# Patient Record
Sex: FEMALE | Race: BLACK OR AFRICAN AMERICAN
[De-identification: names, ages, dates, MRNs, and addresses within clinical notes are randomized per-mention and may not be internally consistent; named-entity substitution may affect disease eponyms.]

---

## 2017-07-20 ENCOUNTER — HOSPITAL ENCOUNTER (OUTPATIENT)
Dept: HOSPITAL 62 - RAD | Age: 42
End: 2017-07-20
Attending: INTERNAL MEDICINE
Payer: SELF-PAY

## 2017-07-20 DIAGNOSIS — Z85.3: ICD-10-CM

## 2017-07-20 DIAGNOSIS — Z15.02: ICD-10-CM

## 2017-07-20 DIAGNOSIS — Z15.01: ICD-10-CM

## 2017-07-20 DIAGNOSIS — R59.0: Primary | ICD-10-CM

## 2017-07-20 PROCEDURE — 76882 US LMTD JT/FCL EVL NVASC XTR: CPT

## 2017-07-20 NOTE — RADIOLOGY REPORT (SQ)
EXAM DESCRIPTION:  U/S EXTREMITY NONVASCULAR LTD



COMPLETED DATE/TIME:  7/20/2017 3:08 pm



REASON FOR STUDY:  ENLARGED LYMPH NODES IN ARMPIT R59.0  LOCALIZED ENLARGED LYMPH NODES



COMPARISON:  None.



TECHNIQUE:  Dynamic and static grayscale images acquired of the localized site of clinical concern an
d recorded on PACS. Additional selected color Doppler and spectral images recorded.

SITE OF CONCERN: Right axilla



LIMITATIONS:  None.



FINDINGS:  SKIN AND SUBCUTANEOUS TISSUES: A small lymph node is identified measuring 1.0 x 0.8 x 0.5 
cm in diameters.  There is an associated asymmetric hypoechoic area measuring 0.6 x 0.5 x 0.4 cm in d
iameter which I cannot exclude as an associated mass.

DEEP SOFT TISSUES/MUSCLES: No masses.  No fluid collections. No edema.

VASCULAR: No increased or decreased vascularity.  No occlusions.

OTHER: No other significant finding.



IMPRESSION:  Small lymph node with an associated asymmetric hypoechoic area as noted above which I ca
nnot exclude as a mass involving the lymph node.  No other discrete solid or cystic masses are identi
fied.  Followup is recommended.  This node may be amenable to ultrasound-guided biopsy.



TECHNICAL DOCUMENTATION:  JOB ID:  [id number]

 2011 [employer]- All Rights Reserved

## 2017-09-06 ENCOUNTER — HOSPITAL ENCOUNTER (OUTPATIENT)
Dept: HOSPITAL 62 - WI | Age: 42
End: 2017-09-06
Attending: INTERNAL MEDICINE
Payer: SELF-PAY

## 2017-09-06 DIAGNOSIS — Z15.02: ICD-10-CM

## 2017-09-06 DIAGNOSIS — R59.0: ICD-10-CM

## 2017-09-06 DIAGNOSIS — Z15.01: ICD-10-CM

## 2017-09-06 DIAGNOSIS — C50.911: Primary | ICD-10-CM

## 2017-09-06 PROCEDURE — 19083 BX BREAST 1ST LESION US IMAG: CPT

## 2017-09-06 PROCEDURE — 88342 IMHCHEM/IMCYTCHM 1ST ANTB: CPT

## 2017-09-06 PROCEDURE — 0HBT3ZX EXCISION OF RIGHT BREAST, PERCUTANEOUS APPROACH, DIAGNOSTIC: ICD-10-PCS | Performed by: RADIOLOGY

## 2017-09-06 PROCEDURE — 88305 TISSUE EXAM BY PATHOLOGIST: CPT

## 2017-09-08 NOTE — WOMENS IMAGING REPORT
EXAM DESCRIPTION:  U/S BREAST BX



COMPLETED DATE/TIME:  9/6/2017 2:37 pm



REASON FOR STUDY:  BREAST CANCER (C50.911) C50.911  MALIGNANT NEOPLASM OF UNSP SITE OF RIGHT FEMALE B
DOMINGO R59.0  LOCALIZED ENLARGED LYMPH NODES Z15.01  GENETIC SUSCEPTIBILITY TO MALIGNANT NEOPLASM OF YURI
AS



COMPARISON:  Ultrasound right axilla 7/28/2017



TECHNIQUE:  The procedure was discussed with the patient and the patient agreed to proceed.

The patient was scanned and the lymph node of concern in the right axilla.  This correlates with the 
area of concern on prior imaging studies. This area was targeted for ultrasound-guided core biopsy.

After sterile skin prep and 3.5 mL local lidocaine 1% for skin and deep tissue anesthesia, a 14 gauge
 coaxial core biopsy needle was used to obtain several cores of tissue from the lesion.  Under ultras
ound guidance, a ribbon clip was placed in the areas sampled.  There were no immediate post-procedure
 complications.

MAMMOGRAM:  Not performed

Pathology yields a diagnosis of benign lymphoid tissue, no atypia or malignancy identified.

Pathology is concordant.



LIMITATIONS:  None.



FINDINGS:  Ultrasound guided right axillary lymph node biopsy as described above.

POST PROCEDURE MAMMOGRAMS FOR MARKER PLACEMENT: No



IMPRESSION:  ULTRASOUND-GUIDED CORE BIOPSY OF THE RIGHT AXILLARY LYMPH NODE YIELDS A BENIGN DIAGNOSIS




COMMENT:  COMMUNICATION: Attempts at calling the patient directly with this information or unsuccessf
ul, patient's cell phone was not active

Patient medication list reviewed: Yes- Quality ID# [id number]:Eligible professional attests to documenting i
n the medical record they obtained, updated, or reviewed the patient's current medications.



TECHNICAL DOCUMENTATION:  JOB ID:  [id number]

 2011 [employer]- All Rights Reserved

## 2018-07-03 ENCOUNTER — HOSPITAL ENCOUNTER (EMERGENCY)
Dept: HOSPITAL 62 - ER | Age: 43
Discharge: HOME | End: 2018-07-03
Payer: SELF-PAY

## 2018-07-03 VITALS — DIASTOLIC BLOOD PRESSURE: 66 MMHG | SYSTOLIC BLOOD PRESSURE: 101 MMHG

## 2018-07-03 DIAGNOSIS — F17.210: ICD-10-CM

## 2018-07-03 DIAGNOSIS — M25.511: Primary | ICD-10-CM

## 2018-07-03 DIAGNOSIS — I10: ICD-10-CM

## 2018-07-03 DIAGNOSIS — Z90.13: ICD-10-CM

## 2018-07-03 DIAGNOSIS — Z90.710: ICD-10-CM

## 2018-07-03 PROCEDURE — 99406 BEHAV CHNG SMOKING 3-10 MIN: CPT

## 2018-07-03 PROCEDURE — 99283 EMERGENCY DEPT VISIT LOW MDM: CPT

## 2018-07-03 NOTE — ER DOCUMENT REPORT
ED Extremity Problem, Upper





- General


Chief Complaint: Shoulder Pain


Stated Complaint: SHOULDER PAIN


Time Seen by Provider: 07/03/18 12:05


Mode of Arrival: Ambulatory


Information source: Patient


Notes: 





43-year-old female presented ED for complaint of right shoulder pain with no 

definite injury.  She states that every once in a while when she tries to lift 

some reduce some she will get swelling to the shoulder.  She has full range of 

motion but some pain to the back of the shoulder and upper shoulder when she is 

doing the range of motion.  Patient is alert and oriented respirations regular 

and unlabored speaking in full sentences and walks with a even steady gait.


TRAVEL OUTSIDE OF THE [place] IN LAST 30 DAYS: No





- HPI


Patient complains to provider of: Right, Shoulder


Onset: Other - About a month ago not getting better


Recent injury: No


Quality of pain: Achy, Sharp


Severity of pain: Moderate


Pain Level: 3


Associated symptoms: Numbness - Sometime when the pain is real bad her fingers 

get numb


Exacerbated by: Movement, Exertion


Relieved by: Rest, Positioning


Similar symptoms previously: Yes


Recently seen / treated by doctor: No





- Related Data


Allergies/Adverse Reactions: 


 





No Known Allergies Allergy (Unverified 07/03/18 11:40)


 











Past Medical History





- General


Information source: Patient





- Social History


Smoking Status: Current Every Day Smoker


Cigarette use (# per day): Yes - 4 cigarettes a day


Chew tobacco use (# tins/day): No


Smoking Education Provided: Yes - 4 minutes


Frequency of alcohol use: Social


Drug Abuse: None


Occupation: [employer]


Lives with: Friend


Family History: Reviewed & Not Pertinent


Patient has suicidal ideation: No


Patient has homicidal ideation: No





- Medical History


Medical History: Other - Iron deficiency anemia





- Past Medical History


Cardiac Medical History: Reports: None


Pulmonary Medical History: Reports: None


EENT Medical History: Reports: None


Neurological Medical History: Reports: None


Endocrine Medical History: Reports: None


Renal/ Medical History: Reports: None


Malignancy Medical History: Reports: Hx Breast Cancer - Right


GI Medical History: Reports: None


Musculoskeltal Medical History: Reports None


Skin Medical History: Reports None


Psychiatric Medical History: Reports: Hx Anxiety, Hx Depression


Traumatic Medical History: Reports: None


Infectious Medical History: Reports: None


Past Surgical History: Reports: Hx Breast Surgery - bilateral mastectomy, Hx 

Hysterectomy





- Immunizations


Immunizations up to date: Yes





Review of Systems





- Review of Systems


Constitutional: No symptoms reported


EENT: No symptoms reported


Cardiovascular: No symptoms reported


Respiratory: No symptoms reported


Gastrointestinal: No symptoms reported


Genitourinary: No symptoms reported


Female Genitourinary: No symptoms reported


Musculoskeletal: Joint pain - right shoulder, Muscle pain, Muscle stiffness


Skin: No symptoms reported


Hematologic/Lymphatic: No symptoms reported


Neurological/Psychological: No symptoms reported


-: Yes All other systems reviewed and negative





Physical Exam





- Vital signs


Vitals: 





 











Temp Pulse Resp BP Pulse Ox


 


 98.2 F   76   16   136/86 H  100 


 


 07/03/18 11:53  07/03/18 11:53  07/03/18 11:53  07/03/18 11:53  07/03/18 11:53














- Extremities


General upper extremity: Normal inspection, Normal color, Normal temperature


General lower extremity: Normal inspection, Nontender, Normal color, Normal ROM

, Normal temperature, Normal weight bearing.  No: Nazario's sign


Shoulder: Tender.  No: Limited ROM - Pain with range of motion but has full 

range of motion





Course





- Re-evaluation


Re-evalutation: 





07/03/18 13:21


X-ray discussed with patient and written report of x-ray given to patient for 

follow-up with orthopedics.  There is no acute radiological injuries.  Patient 

was instructed to follow-up with orthopedics to ensure there is no muscle or 

tendon damage.  Patient was given instructions on ice warm packs shoulder 

exercises and use of ibuprofen and Tylenol for her pain.  Patient was able to 

verbalize understanding of instructions and verbalized agreement with treatment 

plan.





- Vital Signs


Vital signs: 





 











Temp Pulse Resp BP Pulse Ox


 


 98.2 F   76   16   136/86 H  100 


 


 07/03/18 11:53  07/03/18 11:53  07/03/18 11:53  07/03/18 11:53  07/03/18 11:53














- Diagnostic Test


Radiology reviewed: Image reviewed, Reports reviewed





Discharge





- Discharge


Clinical Impression: 


Right shoulder pain


Qualifiers:


 Chronicity: acute Qualified Code(s): M25.511 - Pain in right shoulder





HTN (hypertension)


Qualifiers:


 Hypertension type: unspecified Qualified Code(s): I10 - Essential (primary) 

hypertension





Condition: Stable


Disposition: HOME, SELF-CARE


Additional Instructions: 


Shoulder Injury





     You have injured your shoulder.  This usually results from stretching or 

tearing of the tendons during trauma.  Time and protection are required in 

order to heal properly.  Many injuries are quite disabling, and should be taken 

seriously.


     Initial treatment includes cold packs and a sling to rest the shoulder.  

The physician has assessed the seriousness of your injury, and has outlined a 

treatment plan.  Understand that this treatment may change, depending on how 

you progress.


     If a re-examination was recommended, it is important that you follow up as 

instructed.  Some shoulder injuries (such as partial tear of the rotator cuff) 

are only suspected after you've failed to improve.


Call us if there's severe pain, numbness, or loss of function.








Exercise Program for the Shoulder





     Since the shoulder moves in so many directions, the joint attachment is 

weak.  Muscles provide most of the stability to the shoulder.  You must 

exercise your shoulder to prevent painful instability or stiffening.


     PASSIVE - These may be begun within a few days of the injury. While 

standing, lean forward, allowing the arm to hang down towards the floor.  Move 

the arm in small circles while slowly twisting your chest towards and away from 

the hanging arm.  Do this for one minute.


     ACTIVE - These may be performed when the doctor gives permission. Begin 

with the arms at the sides.  Raise the arms forward (shoulder's width apart) 

until they reach shoulder level.  Then slowly swing both arms back until they 

are aiming straight out away from each other. Then bring them forward again, 

and finally, lower them to your sides. Repeat 20 to 30 times.  As you improve, 

put weights in your hands for the exercise.  Start with one pound, and work up 

to 10 pounds.  Never use more than is comfortable.  Athletes may work up to 30 

pounds.





Acetaminophen





     Acetaminophen may be taken for pain relief or fever control. It's much 

safer than aspirin, offering a wider range of "safe" dosages.  It is safe 

during pregnancy.  Some brand names are Tylenol, Panadol, Datril, Anacin 3, 

Tempra, and Liquiprin. Acetaminophen can be repeated every four hours.  The 

following are maximum recommended dosages:





WEIGHT         Dose             Drops                  Elixir        Chewable(

80mg)


(LBS.)                            drprs=droppers    tsp=teaspoon


6                 40 mg            .4 ml (1/2)


6-11            80 mg            .8 ml (full)            1/2   tsp           1 

      tab


12-16         120 mg           1 1/2 drprs            3/4   tsp           1 1/2

  tabs


17-23         160 mg             2  drprs              1      tsp           2  

     tabs


24-30         240 mg             3  drprs              1 1/2 tsp           3   

    tabs


30-35         320 mg                                     2       tsp           

4       tabs


36-41         360 mg                                     2 1/4 tsp           4 1

/2  tabs


42-47         400 mg                                     2 1/2 tsp           5 

      tabs


48-53         480 mg                                     3       tsp          6

       tabs


54-59         520 mg                                     3  1/4 tsp          6 1

/2 tabs


60-64         560 mg                                     3  1/2 tsp          7 

     tabs 


65-70         600 mg                                     3  3/4 tsp          7 1

/2 tabs


71-76         640 mg                                     4       tsp           

8      tabs


77-82         720 mg                                     4 1/2  tsp           9

      tabs


83-88         800 mg                                     5       tsp           

10     tabs





>89 pounds or adults          650 mg to 900 mg 





Acetaminophen can be repeated every four hours. Maximum daily dose not to 

exceed 4000 mg.





   These maximum recommended dosages are slightly higher than the dosages 

written on the product container, but these dosages are very safe and well 

below the toxic dosage for acetaminophen.








USE OF TYLENOL (ACETAMINOPHEN):


     Acetaminophen may be taken for pain relief or fever control. It's much 

safer than aspirin, offering a wider range of "safe" dosages.  It is safe 

during pregnancy.  Some brand names are Tylenol, Panadol, Datril, Anacin 3, 

Tempra, and Liquiprin. Acetaminophen can be repeated every four hours.  The 

following are maximum recommended dosages:





WEIGHT         Dose             Drops                  Elixir        Chewable(

80mg)


(LBS.)                            drprs=droppers    tsp=teaspoon


6               40 mg            0.4 ml (1/2)


6-11            80 mg            0.8 ml (full)              tsp               

   1       tab


12-16         120 mg           1 1/2 drprs             3/4  tsp               1 

1/2  tabs


17-23         160 mg             2  drprs             1    tsp                 

  2       tabs


24-30         240 mg             3  drprs             1 1/2 tsp                

3       tabs


30-35         320 mg                                       2    tsp            

       4       tabs


36-41         360 mg                                       2 1/4   tsp         

     4 1/2 tabs


42-47         400 mg                                       2 1/2   tsp         

     5      tabs


48-53         480 mg                                       3    tsp            

       6      tabs


54-59         520 mg                                       3  1/4  tsp         

     6 1/2 tabs


60-64         560 mg                                       3  1/2  tsp         

     7      tabs 


65-70         600 mg                                       3  3/4  tsp         

     7 1/2 tabs


71-76         640 mg                                       4   tsp             

      8      tabs


77-82         720 mg                                       4 1/2   tsp         

    9      tabs


83-88         800 mg                                       5   tsp             

    10      tabs





>89 pounds or adults          650 mg to 900 mg





Acetaminophen can be repeated every four hours.  Maximum dose not to exceed 

4000 mg a day.





   These maximum recommended dosages are slightly higher than the dosages 

written on the product container, but these dosages are very safe and below the 

toxic dosage for acetaminophen.








ICE PACKS:


     Apply ice packs frequently against the painful area.  Many different 

schedules are recommended, such as "20 minutes on, 20 minutes off" or "one hour 

ice, two hours rest."  If you need to work, you may need to go longer between 

ice treatments.  You should plan to have the area ice packed AT LEAST one 

fourth of the time.


     The ice should be applied over the wrap, tape, or splint, or over a layer 

of cloth -- not directly against the skin.  Some ice bags have a built-in cloth 

and can be put directly on the skin.





WARM PACKS:


     After approximately two days, apply gentle heat (such as a heating pad or 

hot water bottle) for about 20 to 30 minutes about every two hours -- at least 

four times daily.  Warmth and elevation will help you make a more rapid recovery

, and will ease the pain considerably.


     Do not use HOT heat, and never apply heat for longer than 30 minutes.  The 

continuous heat can invisibly damage skin and muscles -- even when no burn is 

seen on the surface.  Damaged muscles can make you MORE sore.





MUSCLE RELAXERS: 


     Muscle relaxing medications are usually prescribed for acute muscle spasm 

or injury to the neck and back.  They are often combined with antiinflammatory 

pain medication for increased relief.


     You may stop the muscle relaxer when the pain and stiffness have improved.

  Start the medication again if spasms recur.  


     Muscle relaxers may cause drowsiness, especially with the first dose.  Do 

not operate machinery or drive while under the effects of the medication.  Most 

muscle relaxers last up to 24 hours.  Do not combine the medication with 

alcohol.





FOLLOW-UP CARE:


If you have been referred to a physician for follow-up care, call the physician

s office for an appointment as you were instructed or within the next two days.

  If you experience worsening or a significant change in your symptoms, notify 

the physician immediately or return to the Emergency Department at any time for 

re-evaluation.





Prescriptions: 


Cyclobenzaprine HCl [Flexeril 5 mg Tablet] 5 mg PO TID #15 tablet


Forms:  Elevated Blood Pressure, Smoking Cessation Education


Referrals: 


CLEMENT CEDILLO MD [Primary Care Provider] - Follow up as needed


LINO SHEARER MD [ACTIVE STAFF] - Follow up as needed

## 2018-07-03 NOTE — RADIOLOGY REPORT (SQ)
EXAM DESCRIPTION:  SHOULDER RIGHT 2 OR MORE VIEWS



COMPLETED DATE/TIME:  7/3/2018 12:33 pm



REASON FOR STUDY:  pain decreased rom



COMPARISON:  None.



NUMBER OF VIEWS:  Three views.



TECHNIQUE:  Internal rotation, external rotation, and Y view images acquired of the right shoulder.



LIMITATIONS:  None.



FINDINGS:  MINERALIZATION: Normal.

BONES: No acute fracture or dislocation.  No worrisome bone lesions.

JOINTS: No glenohumeral joint malalignment.  No AC joint widening.

VISUALIZED LUNGS AND RIBS: No pneumothorax.  No rib fracture.

SOFT TISSUES: No radiopaque foreign body.

OTHER: No other significant finding.



IMPRESSION:  No acute findings



TECHNICAL DOCUMENTATION:  JOB ID:  [id number]

 2011 [employer]- All Rights Reserved



Reading location - IP/workstation name: [email or web address]

## 2018-12-26 ENCOUNTER — HOSPITAL ENCOUNTER (EMERGENCY)
Dept: HOSPITAL 62 - ER | Age: 43
LOS: 1 days | Discharge: HOME | End: 2018-12-27
Payer: SELF-PAY

## 2018-12-26 DIAGNOSIS — S41.112A: Primary | ICD-10-CM

## 2018-12-26 DIAGNOSIS — F17.210: ICD-10-CM
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
## 2018-12-26 LAB
ADD MANUAL DIFF: NO
ALBUMIN SERPL-MCNC: 4.7 G/DL (ref 3.5–5)
ALP SERPL-CCNC: 73 U/L (ref 38–126)
ALT SERPL-CCNC: 39 U/L (ref 9–52)
ANION GAP SERPL CALC-SCNC: 10 MMOL/L (ref 5–19)
APAP SERPL-MCNC: < 10 UG/ML (ref 10–30)
APPEARANCE UR: (no result)
APTT PPP: YELLOW S
AST SERPL-CCNC: 40 U/L (ref 14–36)
BARBITURATES UR QL SCN: NEGATIVE
BASOPHILS # BLD AUTO: 0.1 10^3/UL (ref 0–0.2)
BASOPHILS NFR BLD AUTO: 0.7 % (ref 0–2)
BILIRUB DIRECT SERPL-MCNC: 0.3 MG/DL (ref 0–0.4)
BILIRUB SERPL-MCNC: 0.5 MG/DL (ref 0.2–1.3)
BILIRUB UR QL STRIP: NEGATIVE
BUN SERPL-MCNC: 13 MG/DL (ref 7–20)
CALCIUM: 9.7 MG/DL (ref 8.4–10.2)
CHLORIDE SERPL-SCNC: 104 MMOL/L (ref 98–107)
CO2 SERPL-SCNC: 26 MMOL/L (ref 22–30)
EOSINOPHIL # BLD AUTO: 0 10^3/UL (ref 0–0.6)
EOSINOPHIL NFR BLD AUTO: 0.4 % (ref 0–6)
ERYTHROCYTE [DISTWIDTH] IN BLOOD BY AUTOMATED COUNT: 13.9 % (ref 11.5–14)
ETHANOL SERPL-MCNC: < 10 MG/DL
GLUCOSE SERPL-MCNC: 121 MG/DL (ref 75–110)
GLUCOSE UR STRIP-MCNC: NEGATIVE MG/DL
HCT VFR BLD CALC: 40.7 % (ref 36–47)
HGB BLD-MCNC: 13.9 G/DL (ref 12–15.5)
KETONES UR STRIP-MCNC: NEGATIVE MG/DL
LYMPHOCYTES # BLD AUTO: 1.4 10^3/UL (ref 0.5–4.7)
LYMPHOCYTES NFR BLD AUTO: 13.8 % (ref 13–45)
MCH RBC QN AUTO: 33.9 PG (ref 27–33.4)
MCHC RBC AUTO-ENTMCNC: 34.2 G/DL (ref 32–36)
MCV RBC AUTO: 99 FL (ref 80–97)
METHADONE UR QL SCN: NEGATIVE
MONOCYTES # BLD AUTO: 0.8 10^3/UL (ref 0.1–1.4)
MONOCYTES NFR BLD AUTO: 8.5 % (ref 3–13)
NEUTROPHILS # BLD AUTO: 7.6 10^3/UL (ref 1.7–8.2)
NEUTS SEG NFR BLD AUTO: 76.6 % (ref 42–78)
NITRITE UR QL STRIP: NEGATIVE
PCP UR QL SCN: NEGATIVE
PH UR STRIP: 7 [PH] (ref 5–9)
PLATELET # BLD: 254 10^3/UL (ref 150–450)
POTASSIUM SERPL-SCNC: 4.2 MMOL/L (ref 3.6–5)
PROT SERPL-MCNC: 7 G/DL (ref 6.3–8.2)
PROT UR STRIP-MCNC: NEGATIVE MG/DL
RBC # BLD AUTO: 4.11 10^6/UL (ref 3.72–5.28)
SALICYLATES SERPL-MCNC: < 1 MG/DL (ref 2–20)
SODIUM SERPL-SCNC: 139.7 MMOL/L (ref 137–145)
SP GR UR STRIP: 1.02
TOTAL CELLS COUNTED % (AUTO): 100 %
URINE AMPHETAMINES SCREEN: NEGATIVE
URINE BENZODIAZEPINES SCREEN: NEGATIVE
URINE COCAINE SCREEN: NEGATIVE
URINE MARIJUANA (THC) SCREEN: (no result)
UROBILINOGEN UR-MCNC: NEGATIVE MG/DL (ref ?–2)
WBC # BLD AUTO: 9.9 10^3/UL (ref 4–10.5)

## 2018-12-26 PROCEDURE — 85025 COMPLETE CBC W/AUTO DIFF WBC: CPT

## 2018-12-26 PROCEDURE — 93010 ELECTROCARDIOGRAM REPORT: CPT

## 2018-12-26 PROCEDURE — 81001 URINALYSIS AUTO W/SCOPE: CPT

## 2018-12-26 PROCEDURE — 80307 DRUG TEST PRSMV CHEM ANLYZR: CPT

## 2018-12-26 PROCEDURE — 84703 CHORIONIC GONADOTROPIN ASSAY: CPT

## 2018-12-26 PROCEDURE — 99285 EMERGENCY DEPT VISIT HI MDM: CPT

## 2018-12-26 PROCEDURE — 93005 ELECTROCARDIOGRAM TRACING: CPT

## 2018-12-26 PROCEDURE — 36415 COLL VENOUS BLD VENIPUNCTURE: CPT

## 2018-12-26 PROCEDURE — 80053 COMPREHEN METABOLIC PANEL: CPT

## 2018-12-26 RX ADMIN — BENZTROPINE MESYLATE SCH MG: 1 TABLET ORAL at 20:17

## 2018-12-26 RX ADMIN — OLANZAPINE SCH MG: 5 TABLET, FILM COATED ORAL at 20:17

## 2018-12-26 NOTE — EKG REPORT
SEVERITY:- ABNORMAL ECG -

SINUS RHYTHM

LEFT VENTRICULAR HYPERTROPHY

ST ELEV, PROBABLE NORMAL EARLY REPOL PATTERN

:

Confirmed by: Abner Jay 26-Dec-2018 13:10:58

## 2018-12-26 NOTE — ER DOCUMENT REPORT
ED Psych Disorder / Suicide





- General


Mode of Arrival: Ambulatory


Information source: Patient


TRAVEL OUTSIDE OF THE [place] IN LAST 30 DAYS: No





<DESIREE ANDRE - Last Filed: 12/26/18 21:22>





<CHARAN ADAMS - Last Filed: 12/26/18 21:30>





- General


Chief Complaint: Suicidal Ideation


Stated Complaint: PSYCH EVAL


Time Seen by Provider: 12/26/18 10:45


Notes: 





43-year-old female who presents to the emergency department today with complain

ts of suicidal ideation.  Patient states she has been off of her psychiatric 

medications for about 2 years.  Patient states she has been diagnosed with 

bipolar disorder and depression.  Patient states this time she has been 

depressed for about 1 week.  Patient states she stabbed her left arm a few days 

ago in a suicide attempt.  Patient states she usually cuts her set herself to 

feel pain however this time it was a suicide attempt.  Patient states she drinks

about a third of a gallon of vodka every day.  Patient denies taking any 

medications recently in attempted suicide. (DESIREE ANDRE)





- Related Data


Allergies/Adverse Reactions: 


                                        





No Known Allergies Allergy (Verified 12/26/18 10:39)


   











Past Medical History





- General


Information source: Patient





- Social History


Smoking Status: Current Every Day Smoker


Cigarette use (# per day): Yes


Chew tobacco use (# tins/day): No


Frequency of alcohol use: Heavy


Drug Abuse: None


Family History: Reviewed & Not Pertinent


Patient has suicidal ideation: Yes


Patient has homicidal ideation: No


Malignancy Medical History: Reports: Hx Breast Cancer - Right


Psychiatric Medical History: Reports: Hx Anxiety, Hx Bipolar Disorder, Hx 

Depression


Past Surgical History: Reports: Hx Breast Surgery - bilateral mastectomy, Hx 

Hysterectomy





- Immunizations


Immunizations up to date: Yes





<DESIREE ANDRE - Last Filed: 12/26/18 21:22>





Review of Systems





- Review of Systems


Constitutional: No symptoms reported


EENT: No symptoms reported


Cardiovascular: No symptoms reported


Respiratory: No symptoms reported


Gastrointestinal: No symptoms reported


Genitourinary: No symptoms reported


Female Genitourinary: No symptoms reported


Musculoskeletal: No symptoms reported


Skin: See HPI, Other - cut to left forearm


Hematologic/Lymphatic: No symptoms reported


Neurological/Psychological: See HPI, Depression, Suicidal ideation


-: Yes All other systems reviewed and negative





<DESIREE ANDRE - Last Filed: 12/26/18 21:22>





Physical Exam





<DESIREE ANDRE - Last Filed: 12/26/18 21:22>





- Vital signs


Vitals: 





                                        











Temp Pulse Resp BP Pulse Ox


 


 99.0 F   96   16   160/95 H  97 


 


 12/26/18 10:41  12/26/18 10:41  12/26/18 10:41  12/26/18 10:41  12/26/18 10:41














- Notes


Notes: 





PHYSICAL EXAM


 


GENERAL: Alert, interacts well. No acute distress.


HEAD: Normocephalic, atraumatic.


EYES: Pupils equal, round, and reactive to light. Extraocular movements intact.


ENT: Oral mucosa moist, tongue midline. 


NECK: Full range of motion. Supple. Trachea midline.


LUNGS: Clear to auscultation bilaterally, no wheezes, rales, or rhonchi. No 

respiratory distress.


HEART: Regular rate and rhythm. No murmurs, gallops, or rubs.


ABDOMEN: Soft, non-tender. Non-distended. Bowel sounds present in all 4 

quadrants. No guarding, rigidity, or rebound.


EXTREMITIES: Moves all 4 extremities spontaneously.


NEUROLOGICAL: Alert and oriented x3. Normal speech. 


PSYCH: Tearful.


SKIN: Well-healing superficial lacerations to the volar forearms on both upper 

extremities.  These superficial lacerations are horizontal.  There is one signif

icant laceration to the left proximal forearm that appears to be several days 

old as there is scabbing.  This laceration measures 1/2 cm wide by 2 cm long.  

There is no surrounding erythema.


 (DESIREE ANDRE)





Course





- Laboratory


Result Diagrams: 


                                 12/26/18 11:11





                                 12/26/18 11:11





<DESIREE ANDRE - Last Filed: 12/26/18 21:22>





- Laboratory


Result Diagrams: 


                                 12/26/18 11:11





                                 12/26/18 11:11





<CHARAN ADAMS - Last Filed: 12/26/18 21:30>





- Re-evaluation


Re-evalutation: 





12/26/18 21:28


Behavioral health was consulted, they agree that she meets IVC criteria, patient

will be kept in the emergency department pending placement, CBC unremarkable, 

CMP unremarkable, pregnancy test negative, urinalysis unremarkable, urine drug 

screen confirms marijuana which she admitted.  Medication recommendations were 

given in the form of Zyprexa and Cogentin.  These have been ordered.  

Involuntary commitment paperwork has been filled out. (CHARAN ADAMS)





- Vital Signs


Vital signs: 





                                        











Temp Pulse Resp BP Pulse Ox


 


 98.0 F   81   16   137/95 H  99 


 


 12/26/18 18:12  12/26/18 18:12  12/26/18 10:41  12/26/18 18:12  12/26/18 18:12














- Laboratory


Laboratory results interpreted by me: 





                                        











  12/26/18 12/26/18





  11:11 11:11


 


MCV  99 H 


 


MCH  33.9 H 


 


Glucose   121 H


 


AST   40 H


 


Salicylates   < 1.0 L


 


Acetaminophen   < 10 L














- EKG Interpretation by Me


Additional EKG results interpreted by me: 





12/26/18 21:29


EKG shows sinus rhythm at a rate of 79, normal axis, normal intervals, no ST 

segment elevations or depressions, LVH, T wave inversions in aVL per my 

interpretation. (CHARAN ADAMS)





Discharge





<DESIREE ANDRE - Last Filed: 12/26/18 21:22>





<CHARAN ADAMS - Last Filed: 12/26/18 21:30>





- Discharge


Clinical Impression: 


 Suicidal ideation, Self mutilating behavior





Laceration of left upper extremity


Qualifiers:


 Encounter type: initial encounter Qualified Code(s): S41.112A - Laceration 

without foreign body of left upper arm, initial encounter





Condition: Stable


Disposition: PSYCH HOSP/UNIT


Referrals: 


CLEMENT CEDILLO MD [ACTIVE STAFF] - Follow up as needed


Scribe Attestation: 





12/26/18 21:30


I personally performed the services described in the documentation, reviewed and

edited the documentation which was dictated to the scribe in my presence, and it

accurately records my words and actions. (CHARAN ADAMS)





Scribe Documentation





- Scribe


Written by Shenibe:: Mike Vazquez, 12/26/2018 1108


acting as scribe for :: Tomás





<DESIREE ANDRE - Last Filed: 12/26/18 21:22>

## 2018-12-26 NOTE — PSYCHOLOGICAL NOTE
Psych Note





- Psych Note


Date seen by psych provider: 12/26/18


Time seen by psych provider: 12:15


Psych Note: 


Reason for Consult: suicidal ideation





43-year-old female who presents to the emergency department today with 

complaints of suicidal ideation. 





Patient disclosed she came to [facility] ED with her mom at her family's request 

stating that she has been suffering from suicidal ideation a lot and feels that 

she needs to be back on her medications.  Patient reports that she does not want

to "be here anymore I pray to God every day to take me naturally so no one will 

talk about it when I am gone."  She continued to report that her plan is to take

"the sleeping pills I have at home...Tylenol PM" and admits to trying to stab 

her arm and attempt to "get that vein that she cannot stop."  She confirms she 

has a history of cutting as a maladaptive coping skill but reports that stabbing

her arm was an attempt; this occurred 2 days ago.  She reports the only thing 

that has stopped her is her children however her youngest child is 18 years old 

now and is good to be graduating from high school. She reports trying to hold on

until he graduates; "people say that it is just giving up being selfish but I do

not think I really care because once I gone I would not really worry about with 

a think anyways."  Patient reports a long history of suicidal ideation starting 

at the age of for 89; "I remember back when I was 10 years old wanting to die." 

Clinician asked what occurred when she was 10 years old at which point the 

patient curled into a ball and started crying and stated "sometimes I remember 

things.. Things I do not want to... So I drink to stop thinking about it."  

Patient currently does not have an outpatient provider and has been off her 

medications for approximately 2 years.





Patient is alert and orientated to person, place, time and circumstance.  Mood 

is dysphoric with flat affect; clinician notes patient did become tearful at one

point during evaluation when thinking of her childhood.  Patient endorses 

suicidal ideation with plan of overdosing on Tylenol.  Patient attempted to take

her maladaptive coping skill of cutting further and stabbed herself 2 days ago 

and attempt to hit an artery.  Patient denies homicidal ideation.  Delusions are

absent behaviors congruent with an intact reality based presentation i.e. 

organized and linear thought process.  Eye contact is poor.  Conversational 

speech is low but easily understood.  Intellectual abilities appear to be within

the average range.  Attention and concentration is fair.  Insight, judgment, 

impulse control is fair.





Medication recommendations per [facility]'s contracted psychiatrist Dr. Arcelia MEIER 

are as follows 


Zyprexa 5 mg twice daily 


Cogentin 1 mg daily





296.80 (F 31.9) unspecified bipolar and related disorder per history provided by

patient


291.9 (F10.99) unspecified alcohol related disorder-maladaptive coping skill


V15.59 (Z 91.5) personal history of self-harm-maladaptive coping skill; cutting


R/O 309.81 (F 43.10) post manic stress disorder





Impression\plan: Patient is recommended for IVC.  Patient came in voluntarily at

the request of family and acknowledges that she needs to get back on medication;

however, the severity of patient's depression and suicidal ideation warrants 

IVC. Patient endorses suicidal ideation with plan of overdosing on Tylonal PM.  

Patient attempted to harm herself 2 days ago.  While patient has a maladaptive 

coping skill of cutting, patient attempted to stab herself in her arm trying to 

hit an artery.  Medication recommendations have been provided.  Patient will be 

reevaluated.  Dr. Reich was consulted and the care management this patient; 

attending physicians in agreement with recommendations and disposition.

## 2018-12-27 VITALS — SYSTOLIC BLOOD PRESSURE: 112 MMHG | DIASTOLIC BLOOD PRESSURE: 73 MMHG

## 2018-12-27 RX ADMIN — OLANZAPINE SCH MG: 5 TABLET, FILM COATED ORAL at 09:02

## 2018-12-27 RX ADMIN — BENZTROPINE MESYLATE SCH MG: 1 TABLET ORAL at 09:02

## 2018-12-27 NOTE — PSYCHOLOGICAL NOTE
Psych Note





- Psych Note


Date seen by psych provider: 12/27/18


Time seen by psych provider: 08:10


Psych Note: 


Reason for Consult: suicidal ideation


consent permissions: Chacho, significant other,[phone] and mother, Paula


43-year-old female who presents to the emergency department today with 

complaints of suicidal ideation. 





Clinician conducted check-in with patient


Patient reports she is not having any thoughts of wanting to harm herself and 

feels that she can successfully follow through with outpatient mental health 

services.  She discloses no concerns about returning home and states that she 

will take her medications and follow through with therapy.





Clinician spoke with patient's significant other, Liu, who confirms that he 

will be part of the patient's plan of care; "I will do whatever it takes."  He 

discloses that he will ensure the patient does not have access to medications 

and weapons and follows through with mental health recommendations.  He reports 

that they support each other and he has no problems pulling her side if he feels

that she has not taken her medications.  He discusses with clinician signs and 

symptoms of what he needs to look out for in the future to ensure the patient 

receives care when needed.





Clinician spoke with patient's mother who confirms she will be part of the 

patient's and of care.  She reports she will be at the hospital shortly to pick 

up they patient. 





Medication recommendations per [facility]'s contracted psychiatrist Dr. Arcelia MEIER 

are as follows 


Zyprexa 5 mg twice daily 


Cogentin 1 mg daily





296.80 (F 31.9) unspecified bipolar and related disorder per history provided by

patient


291.9 (F10.99) unspecified alcohol related disorder-maladaptive coping skill


V15.59 (Z 91.5) personal history of self-harm-maladaptive coping skill; cutting


R/O 309.81 (F 43.10) post traumatic stress disorder





Impression\plan: Patient is recommended for rescind of IVC and is cleared from 

acute psychiatric services.  Patient came in voluntarily at the request of 

family and acknowledges that she needs to get back on medication.  Patient 

denies thoughts of wanting to harm herself and identifies a strong support 

network.  Patient's significant other, Chacho, and patient's mother, Paula, 

confirms they want to be part of the patient's plan of care.  They confirm they 

will ensure she does not have access to medication or weapons and follows 

through with outpatient mental health services. Patient has been provided a 

resource list that includes mobile crisis contact information; patient will be 

released to her mother.  Dr. Reich was consulted and the care management this 

patient; attending physicians in agreement with recommendations and disposition.

## 2018-12-27 NOTE — ER DOCUMENT REPORT
Doctor's Note


Notes: 





12/27/18 09:27


43-year-old female who presents status post self-mutilation with suicidal 

ideation off her medications for 2 years.  They restarted the patient on 

medications.  Labs as recorded.  Vital signs are stable.  Patient denies any 

suicidal or homicidal ideations at this time.  She denies any auditory visual 

hallucinations.  The psychology/psychiatry team is going to call mom and 

significant other "Liu" today and discussed the patient possibly going home 

and being treated as an outpatient.





12/27/18 11:23


Patient is calm and cooperative and denies any suicidal ideations at this time. 

Labs and vital signs as recorded.  Mom and boyfriend are both here in the 

emergency department and are very comfortable taking the patient home.  The 

psychiatrist/psychology team does not believe the patient feels IVC requirements

at this time.  We will provide a one-month prescription for the 

antipsychotic/antidepression meds and have the patient follow-up with integrated

family services.

## 2019-01-19 ENCOUNTER — HOSPITAL ENCOUNTER (EMERGENCY)
Dept: HOSPITAL 62 - ER | Age: 44
LOS: 2 days | Discharge: PSYCHIATRIC OR SUBSTANCE USE FACILITY | End: 2019-01-21
Payer: SELF-PAY

## 2019-01-19 DIAGNOSIS — Z79.899: ICD-10-CM

## 2019-01-19 DIAGNOSIS — F12.10: ICD-10-CM

## 2019-01-19 DIAGNOSIS — Z23: ICD-10-CM

## 2019-01-19 DIAGNOSIS — Z85.3: ICD-10-CM

## 2019-01-19 DIAGNOSIS — Z91.14: ICD-10-CM

## 2019-01-19 DIAGNOSIS — S61.512A: Primary | ICD-10-CM

## 2019-01-19 DIAGNOSIS — F31.9: ICD-10-CM

## 2019-01-19 DIAGNOSIS — F17.200: ICD-10-CM
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
## 2019-01-19 LAB
ADD MANUAL DIFF: NO
ALBUMIN SERPL-MCNC: 5 G/DL (ref 3.5–5)
ALP SERPL-CCNC: 65 U/L (ref 38–126)
ALT SERPL-CCNC: 28 U/L (ref 9–52)
ANION GAP SERPL CALC-SCNC: 13 MMOL/L (ref 5–19)
APPEARANCE UR: (no result)
APTT PPP: YELLOW S
AST SERPL-CCNC: 29 U/L (ref 14–36)
BARBITURATES UR QL SCN: NEGATIVE
BASOPHILS # BLD AUTO: 0 10^3/UL (ref 0–0.2)
BASOPHILS NFR BLD AUTO: 0.4 % (ref 0–2)
BILIRUB DIRECT SERPL-MCNC: 0.3 MG/DL (ref 0–0.4)
BILIRUB SERPL-MCNC: 0.3 MG/DL (ref 0.2–1.3)
BILIRUB UR QL STRIP: NEGATIVE
BUN SERPL-MCNC: 16 MG/DL (ref 7–20)
CALCIUM: 9.6 MG/DL (ref 8.4–10.2)
CHLORIDE SERPL-SCNC: 108 MMOL/L (ref 98–107)
CO2 SERPL-SCNC: 25 MMOL/L (ref 22–30)
EOSINOPHIL # BLD AUTO: 0.1 10^3/UL (ref 0–0.6)
EOSINOPHIL NFR BLD AUTO: 0.9 % (ref 0–6)
ERYTHROCYTE [DISTWIDTH] IN BLOOD BY AUTOMATED COUNT: 13.3 % (ref 11.5–14)
ETHANOL SERPL-MCNC: 197 MG/DL
GLUCOSE SERPL-MCNC: 101 MG/DL (ref 75–110)
GLUCOSE UR STRIP-MCNC: NEGATIVE MG/DL
HCT VFR BLD CALC: 42.3 % (ref 36–47)
HGB BLD-MCNC: 14.5 G/DL (ref 12–15.5)
KETONES UR STRIP-MCNC: NEGATIVE MG/DL
LYMPHOCYTES # BLD AUTO: 2.1 10^3/UL (ref 0.5–4.7)
LYMPHOCYTES NFR BLD AUTO: 22.3 % (ref 13–45)
MCH RBC QN AUTO: 33.9 PG (ref 27–33.4)
MCHC RBC AUTO-ENTMCNC: 34.2 G/DL (ref 32–36)
MCV RBC AUTO: 99 FL (ref 80–97)
METHADONE UR QL SCN: NEGATIVE
MONOCYTES # BLD AUTO: 0.7 10^3/UL (ref 0.1–1.4)
MONOCYTES NFR BLD AUTO: 7.7 % (ref 3–13)
NEUTROPHILS # BLD AUTO: 6.4 10^3/UL (ref 1.7–8.2)
NEUTS SEG NFR BLD AUTO: 68.7 % (ref 42–78)
NITRITE UR QL STRIP: NEGATIVE
PCP UR QL SCN: NEGATIVE
PH UR STRIP: 5 [PH] (ref 5–9)
PLATELET # BLD: 296 10^3/UL (ref 150–450)
POTASSIUM SERPL-SCNC: 4.4 MMOL/L (ref 3.6–5)
PROT SERPL-MCNC: 7.9 G/DL (ref 6.3–8.2)
PROT UR STRIP-MCNC: 100 MG/DL
RBC # BLD AUTO: 4.27 10^6/UL (ref 3.72–5.28)
SODIUM SERPL-SCNC: 146 MMOL/L (ref 137–145)
SP GR UR STRIP: 1.01
TOTAL CELLS COUNTED % (AUTO): 100 %
URINE AMPHETAMINES SCREEN: NEGATIVE
URINE BENZODIAZEPINES SCREEN: NEGATIVE
URINE COCAINE SCREEN: NEGATIVE
URINE MARIJUANA (THC) SCREEN: (no result)
UROBILINOGEN UR-MCNC: NEGATIVE MG/DL (ref ?–2)
WBC # BLD AUTO: 9.3 10^3/UL (ref 4–10.5)

## 2019-01-19 PROCEDURE — 12001 RPR S/N/AX/GEN/TRNK 2.5CM/<: CPT

## 2019-01-19 PROCEDURE — 85025 COMPLETE CBC W/AUTO DIFF WBC: CPT

## 2019-01-19 PROCEDURE — 93005 ELECTROCARDIOGRAM TRACING: CPT

## 2019-01-19 PROCEDURE — 90471 IMMUNIZATION ADMIN: CPT

## 2019-01-19 PROCEDURE — 99285 EMERGENCY DEPT VISIT HI MDM: CPT

## 2019-01-19 PROCEDURE — 81001 URINALYSIS AUTO W/SCOPE: CPT

## 2019-01-19 PROCEDURE — 90715 TDAP VACCINE 7 YRS/> IM: CPT

## 2019-01-19 PROCEDURE — 93010 ELECTROCARDIOGRAM REPORT: CPT

## 2019-01-19 PROCEDURE — 80053 COMPREHEN METABOLIC PANEL: CPT

## 2019-01-19 PROCEDURE — 36415 COLL VENOUS BLD VENIPUNCTURE: CPT

## 2019-01-19 PROCEDURE — 80307 DRUG TEST PRSMV CHEM ANLYZR: CPT

## 2019-01-19 NOTE — ER DOCUMENT REPORT
ED Medical Screen (RME)





- General


Chief Complaint: Suicidal Ideation


Stated Complaint: SUICIDAL IDEATION


Time Seen by Provider: 01/19/19 16:25


Notes: 





Patient is here for suicidal thoughts.  Brought in by her mother.  Patient says 

she is been feeling suicidal "forever".  Today, she cut her left arm, which she 

has done several times in the past.  She does have a 2 cm laceration of the 

wrist that looks like it probably will need a couple of sutures or staples.  

Patient says she was seen here a couple of weeks ago.  Was put on medications 

and she is taking 1 of them but cannot afford the other one.  She is very 

tearful at this time.  Patient has a diagnosis of depression and bipolar 

disorder.





Other important history is a patient has had bilateral mastectomies about a year

ago with no known residual breast cancer.


TRAVEL OUTSIDE OF THE [place] IN LAST 30 DAYS: No





- Related Data


Allergies/Adverse Reactions: 


                                        





No Known Allergies Allergy (Verified 01/19/19 16:01)


   











Past Medical History





- Social History


Chew tobacco use (# tins/day): No


Frequency of alcohol use: Social


Drug Abuse: Marijuana


Renal/ Medical History: Denies: Hx Peritoneal Dialysis


Malignancy Medical History: Reports: Hx Breast Cancer - Right


Psychiatric Medical History: Reports: Hx Anxiety, Hx Bipolar Disorder, Hx 

Depression


Past Surgical History: Reports: Hx Breast Surgery - bilateral mastectomy, Hx 

Hysterectomy





- Immunizations


Immunizations up to date: Yes





Physical Exam





- Vital signs


Vitals: 





                                        











Temp Pulse Resp BP Pulse Ox


 


 97.4 F   95   18   123/86 H  100 


 


 01/19/19 16:04  01/19/19 16:04  01/19/19 16:04  01/19/19 16:04  01/19/19 16:04














Course





- Vital Signs


Vital signs: 





                                        











Temp Pulse Resp BP Pulse Ox


 


 97.4 F   95   18   123/86 H  100 


 


 01/19/19 16:04  01/19/19 16:04  01/19/19 16:04  01/19/19 16:04  01/19/19 16:04

## 2019-01-19 NOTE — EKG REPORT
SEVERITY:- ABNORMAL ECG -

SINUS RHYTHM

CONSIDER LEFT VENTRICULAR HYPERTROPHY

:

Confirmed by: Abner Jay 19-Jan-2019 18:34:11

## 2019-01-19 NOTE — ER DOCUMENT REPORT
ED Psych Disorder / Suicide





- General


Chief Complaint: Suicidal Ideation


Stated Complaint: SUICIDAL IDEATION


Time Seen by Provider: 01/19/19 16:25


Information source: Patient


Notes: 





Patient is a 43-year-old female brought in by her mother with suicidal ideations

and cutting her wrist.  Patient has been seen here previously with similar 

suicidal complaints.  Patient denies any auditory or visual hallucinations.  

Patient states that she hears only her own voices that are not telling her any 

command hallucinations.  Patient states that she "normally says stuff when she 

is here just to get released" but has always wanted to kill herself.  Patient 

states she is only taking 1 of 2 medications prescribed secondary to the cost of

the second medication.  She is unsure of her tetanus status.


TRAVEL OUTSIDE OF THE [place] IN LAST 30 DAYS: No





- HPI


Patient complains to provider of: Homicidal attempt, Suicidal ideation


Onset: Just prior to arrival


Onset was: Sudden


Quality of pain: Achy


Severity: Mild


Pain Level: Denies


Suicide Risk Factors: Other - See above


Suicide Attempt Method: Other - See above


Overdose of: Other


Injury to: Wrist


Associated symptoms: Other - See above


Similar symptoms previously: No


Recently seen / treated by doctor: No





- Related Data


Allergies/Adverse Reactions: 


                                        





No Known Allergies Allergy (Verified 01/19/19 16:01)


   











Past Medical History





- Social History


Smoking Status: Current Every Day Smoker


Chew tobacco use (# tins/day): No


Frequency of alcohol use: Social


Drug Abuse: Marijuana


Family History: Reviewed & Not Pertinent


Patient has suicidal ideation: Yes


Patient has homicidal ideation: No


Renal/ Medical History: Denies: Hx Peritoneal Dialysis


Malignancy Medical History: Reports: Hx Breast Cancer - Right


Psychiatric Medical History: Reports: Hx Anxiety, Hx Bipolar Disorder, Hx D

epression


Past Surgical History: Reports: Hx Breast Surgery - bilateral mastectomy, Hx 

Hysterectomy





- Immunizations


Immunizations up to date: Yes





Review of Systems





- Review of Systems


Constitutional: denies: Fever


EENT: denies: Eye discharge, Nose discharge


Cardiovascular: denies: Chest pain, Palpitations


Respiratory: denies: Short of breath


Gastrointestinal: denies: Abdominal pain, Vomiting


Genitourinary: denies: Dysuria


Musculoskeletal: denies: Leg swelling


Neurological/Psychological: Other - no slurred speech


-: Yes All other systems reviewed and negative





Physical Exam





- Vital signs


Vitals: 


                                        











Temp Pulse Resp BP Pulse Ox


 


 97.4 F   95   18   123/86 H  100 


 


 01/19/19 16:04  01/19/19 16:04  01/19/19 16:04  01/19/19 16:04  01/19/19 16:04











Notes: 





Reviewed vital signs and nursing note as charted by RN.





CONSTITUTIONAL: Alert and oriented and responds appropriately to questions





HEAD: Normocephalic; atraumatic





EYES: PERRL; no nystagmus, sclerae non-icteric





ENT: Normal nose; no rhinorrhea; moist mucous membranes; pharynx without lesions

noted





NECK: Supple without meningismus; non-tender; no cervical lymphadenopathy, no 

masses





CARD: Regular rate and rhythm; no murmurs; symmetric distal pulses





RESP: Normal chest excursion without splinting or tachypnea; breath sounds clear

and equal bilaterally





ABD/GI: Normal bowel sounds; non-distended; soft, non-tender to deep palpation 

of all 4 quadrants of the abdomen





BACK: The back appears normal and is non-tender to palpation





EXT: Normal ROM in all joints; hemostatic laceration to the left medial wrist 

around 2 cm in diameter





SKIN: See above





NEURO: CN 2-12 intact; 5/5 bilateral upper and lower extremity strength with 

sensation intact to light touch





PSYCH: Flat affect consistent with presentation history





Course





- Re-evaluation


Re-evalutation: 





01/19/19 17:14


Given the above history and physical examination we will provide a tetanus shot,

irrigate and suture the wound appropriately, order the psychiatric laboratory 

profile as requested, obtain an EKG and reassess.





EKG shows a heart rate of 95, normal sinus rhythm, normal axis, minimal LVH, no 

ST elevation or depression.





- Vital Signs


Vital signs: 


                                        











Temp Pulse Resp BP Pulse Ox


 


 97.4 F   95   18   123/86 H  100 


 


 01/19/19 16:04  01/19/19 16:04  01/19/19 16:04  01/19/19 16:04  01/19/19 16:04














- Laboratory


Result Diagrams: 


                                 01/19/19 16:40





                                 01/19/19 16:40


Laboratory results interpreted by me: 


                                        











  01/19/19 01/19/19 01/19/19





  16:40 16:40 16:40


 


MCV  99 H  


 


MCH  33.9 H  


 


Sodium   146.0 H 


 


Chloride   108 H 


 


Urine Protein    100 H














Procedures





- Laceration/Wound Repair


  ** Left Wrist


Wound length (cm): 2


Wound's Depth, Shape: Superficial, Linear


Laceration pre-procedure: Chloraprep applied


Anesthetic type: 1% Lidocaine w/epi


Wound explored: Clean


Wound Repaired With: Sutures


Suture Size/Type: 4:0


Number of Sutures: 3


Post-procedure wound care: Sterile dressing applied


Post-procedure NV exam normal: Yes


Complications: No





Discharge





- Discharge


Clinical Impression: 


 Suicide ideation, Suicide attempt





Laceration of left wrist


Qualifiers:


 Encounter type: initial encounter Qualified Code(s): S61.512A - Laceration 

without foreign body of left wrist, initial encounter





Alcohol intoxication


Qualifiers:


 Complication of substance-induced condition: with unspecified complication 

Qualified Code(s): F10.929 - Alcohol use, unspecified with intoxication, 

unspecified





Condition: Fair

## 2019-01-20 RX ADMIN — BENZTROPINE MESYLATE SCH MG: 1 TABLET ORAL at 13:29

## 2019-01-20 NOTE — PSYCHOLOGICAL NOTE
Psych Note





- Psych Note


Date seen by psych provider: 01/20/19


Time seen by psych provider: 07:05 - Chart review at 0706. Evaluation from 1137-

[id number]


Psych Note: 


Reason for Consult: SI, SIB cut left wrist with knife required stitches





Contact Permissions: Unknown. 12/26/18 visit her  Chacho and Mother 

Paula were involved





Patient is a 43 year old female who presented to the ED last evening via walk in

with mother for cutting her left wrist with a knife. He Serum Alcohol Level upon

arrival to the ED was 197. UDS was positive for Cannabis. When asked how she was

doing she stated "I'm okay." She stated said "i don't know why I am here, I 

didn't ask for help my mom made me come." She stated she took the medications 

prescribed from the 12/26/18 ED visit for a couple days then she was told she 

had to pay $200 and she cannot afford that. She admitted to cutting herself. She

showed this clinician her left wrist were there were several stitches. She 

admitted to previous  hospitalizations, the last one being a couple years ago 

in [place] for similar etiology. 





Patient was alert and oriented to person, place, time and situation. Mood was 

depressed with flat affect. She presented aloof. She was somewhat guarded and 

not forth coming. She denied current SI and did not deny cutting herself. She 

denied HI. She did not appear to be responding to internal stimuli as evidenced 

by staying on topic and answering questions when addressed. Thought processes we

re linear. Conversational speech was soft and monotone. Intellectual abilities 

are estimated to be average. Insight, judgment and impulse control were poor as 

evidenced by depressed mood with flat affect.





Chart review revealed patient was seen in the ED by the Behavioral Health team 

on 12/26/18 due to similar etiology (Depression, SI, SIB and alcohol use as 

negative coping skills), at that time she said she had thoughts of stabbing her 

arm (had tried per patient) and overdosing on sleeping medication, during that 

visit she commented her youngest child is 18 and gradutes soon so she could hold

on til then, she was started on medication (Zyprexa 5MG BID, Cogentin 1MG QD), 

held overnight and a plan of care took place with patient's  and mother 

which included taking safety measures such as patient not having control of 

medications and administration and locking up immediate sharp objects.   


 Diagnosis:


296.80 (F31.9) Unspecified Bipolar and Related Disorder


303.90 (F10.20) Alcohol Use Disorder, Moderate to Severe (as negative coping 

skill)


292.9 (F12.99) Unspecified Cannabis Related Disorder


V15.59 (Z91.5) Personal History of self harm (as negative coping skill)





Medication recommendations made by the psychiatric medical provider, Dr. Arcelia MD., includes:


Add Zyprexa 5MG PO twice a day for mood stabilization


Add Cogentin 1MG PO daily to curb tremor side effects often associated with 

antipsychotic medications





Impression/Plan: Recommendation to IVC patient. She presented to the ED under 

the influence of alcohol (typically uses as negative coping skill), positive for

Cannabis and cut left wrist with a knife which resulted in stitches. She 

presented depressed with flat affect. She was guarded and not forth coming. She 

was seen 12/26/18 for similar etiology where she had thoughts of SI (has history

of SIB as negative coping skill), mentioned trying to cut her arm or overdosing 

on sleeping medication at that time and now has taken action. Consulted with Dr. Reich regarding the management and care of patient. ED Physician in agreement 

with recommendations.

## 2019-01-21 VITALS — SYSTOLIC BLOOD PRESSURE: 120 MMHG | DIASTOLIC BLOOD PRESSURE: 70 MMHG

## 2019-01-21 RX ADMIN — OLANZAPINE SCH MG: 5 TABLET, FILM COATED ORAL at 09:54

## 2019-01-21 RX ADMIN — OLANZAPINE SCH: 5 TABLET, FILM COATED ORAL at 09:42

## 2019-01-21 RX ADMIN — BENZTROPINE MESYLATE SCH MG: 1 TABLET ORAL at 09:54

## 2019-01-21 NOTE — ER DOCUMENT REPORT
Doctor's Note


Notes: 





01/21/19 09:42


Rounds: Chart reviewed and patient interviewed.  I happened to be on duty in 

triage 2 nights ago when this patient came in so I am familiar with her 

background.  Patient's expressing suicidal thoughts.  Says she has been 

depressed "forever".  She self-inflicted a laceration of her left wrist which 

was repaired.  Patient's underlying mental illnesses bipolar disorder.  Also 

depression.  Labs on admission had a blood alcohol of 197 and positive marijuana

vital signs are all essentially normal.  Patient was started on Zyprexa 5 mg 

twice a day and Cogentin 1 mg daily.  Latest information is the patient is going

to be transferred to [facility] psychiatric facility sometime today.  

Patient appears to be medically stable for transfer or discharge.


BRITTANI Anderson MD

## 2019-02-18 ENCOUNTER — HOSPITAL ENCOUNTER (EMERGENCY)
Dept: HOSPITAL 62 - ER | Age: 44
Discharge: ANOTHER HOSPITAL | End: 2019-02-18
Payer: SELF-PAY

## 2019-02-18 VITALS — DIASTOLIC BLOOD PRESSURE: 74 MMHG | SYSTOLIC BLOOD PRESSURE: 104 MMHG

## 2019-02-18 DIAGNOSIS — R41.82: ICD-10-CM

## 2019-02-18 DIAGNOSIS — Z85.3: ICD-10-CM

## 2019-02-18 DIAGNOSIS — I61.8: Primary | ICD-10-CM

## 2019-02-18 DIAGNOSIS — R22.1: ICD-10-CM

## 2019-02-18 DIAGNOSIS — R56.9: ICD-10-CM

## 2019-02-18 DIAGNOSIS — Z92.21: ICD-10-CM

## 2019-02-18 LAB
ADD MANUAL DIFF: NO
ALBUMIN SERPL-MCNC: 4.4 G/DL (ref 3.5–5)
ALP SERPL-CCNC: 66 U/L (ref 38–126)
ALT SERPL-CCNC: 19 U/L (ref 9–52)
ANION GAP SERPL CALC-SCNC: 12 MMOL/L (ref 5–19)
APTT BLD: 24.6 SEC (ref 23.5–35.8)
AST SERPL-CCNC: 25 U/L (ref 14–36)
BASOPHILS # BLD AUTO: 0 10^3/UL (ref 0–0.2)
BASOPHILS NFR BLD AUTO: 0.5 % (ref 0–2)
BILIRUB DIRECT SERPL-MCNC: 0.2 MG/DL (ref 0–0.4)
BILIRUB SERPL-MCNC: 0.3 MG/DL (ref 0.2–1.3)
BUN SERPL-MCNC: 17 MG/DL (ref 7–20)
CALCIUM: 9.2 MG/DL (ref 8.4–10.2)
CHLORIDE SERPL-SCNC: 105 MMOL/L (ref 98–107)
CK MB SERPL-MCNC: 0.76 NG/ML (ref ?–4.55)
CK SERPL-CCNC: 156 U/L (ref 30–135)
CO2 SERPL-SCNC: 25 MMOL/L (ref 22–30)
EOSINOPHIL # BLD AUTO: 0.2 10^3/UL (ref 0–0.6)
EOSINOPHIL NFR BLD AUTO: 1.8 % (ref 0–6)
ERYTHROCYTE [DISTWIDTH] IN BLOOD BY AUTOMATED COUNT: 13.6 % (ref 11.5–14)
GLUCOSE SERPL-MCNC: 185 MG/DL (ref 75–110)
HCT VFR BLD CALC: 36.6 % (ref 36–47)
HGB BLD-MCNC: 12.6 G/DL (ref 12–15.5)
INR PPP: 0.86
LYMPHOCYTES # BLD AUTO: 3.8 10^3/UL (ref 0.5–4.7)
LYMPHOCYTES NFR BLD AUTO: 43.9 % (ref 13–45)
MCH RBC QN AUTO: 33.7 PG (ref 27–33.4)
MCHC RBC AUTO-ENTMCNC: 34.5 G/DL (ref 32–36)
MCV RBC AUTO: 98 FL (ref 80–97)
MONOCYTES # BLD AUTO: 0.9 10^3/UL (ref 0.1–1.4)
MONOCYTES NFR BLD AUTO: 10.1 % (ref 3–13)
NEUTROPHILS # BLD AUTO: 3.8 10^3/UL (ref 1.7–8.2)
NEUTS SEG NFR BLD AUTO: 43.7 % (ref 42–78)
PLATELET # BLD: 326 10^3/UL (ref 150–450)
POTASSIUM SERPL-SCNC: 3.3 MMOL/L (ref 3.6–5)
PROT SERPL-MCNC: 6.8 G/DL (ref 6.3–8.2)
PROTHROMBIN TIME: 12.2 SEC (ref 11.4–15.4)
RBC # BLD AUTO: 3.75 10^6/UL (ref 3.72–5.28)
SODIUM SERPL-SCNC: 141.5 MMOL/L (ref 137–145)
TOTAL CELLS COUNTED % (AUTO): 100 %
TROPONIN I SERPL-MCNC: < 0.012 NG/ML
WBC # BLD AUTO: 8.8 10^3/UL (ref 4–10.5)

## 2019-02-18 PROCEDURE — 93005 ELECTROCARDIOGRAM TRACING: CPT

## 2019-02-18 PROCEDURE — 51702 INSERT TEMP BLADDER CATH: CPT

## 2019-02-18 PROCEDURE — 96375 TX/PRO/DX INJ NEW DRUG ADDON: CPT

## 2019-02-18 PROCEDURE — 82550 ASSAY OF CK (CPK): CPT

## 2019-02-18 PROCEDURE — 80053 COMPREHEN METABOLIC PANEL: CPT

## 2019-02-18 PROCEDURE — 99291 CRITICAL CARE FIRST HOUR: CPT

## 2019-02-18 PROCEDURE — 82553 CREATINE MB FRACTION: CPT

## 2019-02-18 PROCEDURE — 96365 THER/PROPH/DIAG IV INF INIT: CPT

## 2019-02-18 PROCEDURE — 85730 THROMBOPLASTIN TIME PARTIAL: CPT

## 2019-02-18 PROCEDURE — 36415 COLL VENOUS BLD VENIPUNCTURE: CPT

## 2019-02-18 PROCEDURE — 70450 CT HEAD/BRAIN W/O DYE: CPT

## 2019-02-18 PROCEDURE — 85610 PROTHROMBIN TIME: CPT

## 2019-02-18 PROCEDURE — 71045 X-RAY EXAM CHEST 1 VIEW: CPT

## 2019-02-18 PROCEDURE — 84484 ASSAY OF TROPONIN QUANT: CPT

## 2019-02-18 PROCEDURE — 94660 CPAP INITIATION&MGMT: CPT

## 2019-02-18 PROCEDURE — 31500 INSERT EMERGENCY AIRWAY: CPT

## 2019-02-18 PROCEDURE — 85025 COMPLETE CBC W/AUTO DIFF WBC: CPT

## 2019-02-18 PROCEDURE — 93010 ELECTROCARDIOGRAM REPORT: CPT

## 2019-02-18 NOTE — RADIOLOGY REPORT (SQ)
EXAM DESCRIPTION:  CHEST SINGLE VIEW



COMPLETED DATE/TIME:  2/18/2019 11:26 am



REASON FOR STUDY:  bed t1 stroke alert



COMPARISON:  7/20/2015



EXAM PARAMETERS:  NUMBER OF VIEWS:  One view

TECHNIQUE:  Single frontal radiograph of the chest.

RADIATION DOSE: N/A

LIMITATIONS: None.



FINDINGS:  TEMPORARY SUPPORT DEVICES:ETT in expected location.  NG tube courses below the ed-diaphr
agm in to the stomach.

LUNGS AND PLEURA: No opacities.  No masses.  No effusions.  No pneumothorax.

MEDIASTINUM AND HILAR STRUCTURES: No masses.  Contour normal.

HEART AND VASCULAR STRUCTURES: Heart size normal.  Normal vascularity.  Aorta normal for age

BONES: No acute findings.

OTHER: No other significant finding.



IMPRESSION:  NO ACUTE RADIOGRAPHIC FINDING IN THE CHEST.

SUPPORT DEVICE(S) IN EXPECTED LOCATIONS.



TECHNICAL DOCUMENTATION:  JOB ID:  [id number]

 2011 [employer]- All Rights Reserved



Reading location - IP/workstation name: [email or web address]

## 2019-02-18 NOTE — EKG REPORT
SEVERITY:- ABNORMAL ECG -

SINUS RHYTHM

LEFT VENTRICULAR HYPERTROPHY

:

Confirmed by: Vernon Jimenez MD 18-Feb-2019 17:03:46
For information on Fall & Injury Prevention, visit [email or web address]

## 2019-02-18 NOTE — RADIOLOGY REPORT (SQ)
EXAM DESCRIPTION:  CT HEAD WITHOUT



COMPLETED DATE/TIME:  2/18/2019 10:55 am



REASON FOR STUDY:  bed t1 stroke alert



COMPARISON:  None.



TECHNIQUE:  Axial images acquired through the brain without intravenous contrast.  Images reviewed wi
th bone, brain and subdural windows.  Additional sagittal and coronal reconstructions were generated.
 Images stored on PACS.

All CT scanners at this facility use dose modulation, iterative reconstruction, and/or weight based d
osing when appropriate to reduce radiation dose to as low as reasonably achievable (ALARA).

[facility]: Dose Right  [facility]: CareDose    [facility]: Dose Right    [facility]: Teradose 4D    [facility]: Smart Technologies



RADIATION DOSE:  CT Rad equipment meets quality standard of care and radiation dose reduction techniq
ues were employed. CTDIvol: 53.2 mGy. DLP: 991 mGy-cm. mGy.



LIMITATIONS:  None.



FINDINGS:  Of large geographic area of high attenuation in the right frontal lobe.  There is blood in
 the ventricles.  7 mm right to left midline shift.  No extra-axial fluid collection.



IMPRESSION:  Hemorrhagic stroke right MCA territory.  7 mm right to left midline shift.

EVIDENCE OF ACUTE STROKE: YES.  RIGHT MCA.



COMMENT:   Pertinent positive or negative findings of the imaging study reported as a CRITICAL EXAM t
o ER PROVIDER  at11:00 on 2/18/2019.

Category of Critical Exam:

Pertinent findings on the imaging study reported as a CRITICAL RESULT to Dr Robbins  at11:00 on 2/18/20
19.

Category of Critical Result: Stroke alert.  Intracranial hemorrhage.

Quality ID # [id number]: Final reports with documentation of one or more dose reduction techniques (e.g., Au
tomated exposure control, adjustment of the mA and/or kV according to patient size, use of iterative 
reconstruction technique)



TECHNICAL DOCUMENTATION:  JOB ID:  [id number]

 2011 [employer]- All Rights Reserved



Reading location - IP/workstation name: [email or web address]

## 2019-02-18 NOTE — ER DOCUMENT REPORT
ED General





- General


Chief Complaint: Chest Pain


Stated Complaint: POSSIBLE STROKE


Time Seen by Provider: 02/18/19 11:30


Mode of Arrival: Ambulatory


Information source: Relative, Emergency Med Personnel


Cannot obtain history due to: Altered mental status


Notes: 





This is a 43-year-old female with a history of breast cancer (status post 

mastectomy 2 years ago, status post chemotherapy at that time, has been cancer 

free as per mother for the past 2 years) and depression.  Patient's boyfriend 

states that the patient awoke with a mild headache which gradually worsened.  

Patient went out to get some aspirin and was coming home to cook.  The boyfriend

states that the patient started having more severe headache, then started 

developing right-sided weakness, then stopped talking.  EMS was called to the 

house and the patient was brought in on stroke alert.  Patient was brought right

to the CAT scan.  I saw the patient in CAT scan and at the conclusion of the CT 

scan, the patient started to have generalized tonic-clonic activity, then noted 

to have decerebrate rigidity.  Patient was intubated emergently at that time.





Note: Family does state that the patient's blood pressure has been elevated in 

the past but she is not been on any medicines.


TRAVEL OUTSIDE OF THE [place] IN LAST 30 DAYS: No





- HPI


Onset: This morning


Onset/Duration: Gradual





- Related Data


Allergies/Adverse Reactions: 


                                        





No Known Allergies Allergy (Verified 02/18/19 10:48)


   











Past Medical History





- General


Information source: Emergency Med Personnel


Cannot obtain history due to: Altered mental status





- Social History


Smoking Status: Unknown if Ever Smoked


Lives with: Spouse/Significant other


Family History: Reviewed & Not Pertinent


Patient has suicidal ideation: No


Patient has homicidal ideation: No


Renal/ Medical History: Denies: Hx Peritoneal Dialysis


Malignancy Medical History: Reports: Hx Breast Cancer - Right


Psychiatric Medical History: Reports: Hx Anxiety, Hx Bipolar Disorder, Hx 

Depression


Past Surgical History: Reports: Hx Breast Surgery - bilateral mastectomy, Hx 

Hysterectomy





- Immunizations


Immunizations up to date: Yes





Review of Systems





- Review of Systems


-: Yes ROS unobtainable due to patient's medical condition





Physical Exam





- Vital signs


Vitals: 


                                        











Pulse Ox


 


 98 


 


 02/18/19 10:54











Notes: 





Physical exam:


 


GENERAL: 43-year-old female, unresponsive and seizing.  Physical exam is limited

at this time due to unresponsiveness.





HEAD: Atraumatic, normocephalic.





EYES:  conjunctiva are normal.





ENT:   Moist mucous membranes.





NECK: Obvious masses, active seizing.





LUNGS: No obvious wheezing or crackles.  No wheezes rales or rhonchi.





Breasts: Bilateral mastectomies





HEART: Regular rate and rhythm without murmurs, rubs or gallops.





ABDOMEN: Soft, normoactive bowel sounds.  No tenderness to palpation.  No 

guarding, no rebound.  No masses appreciated.





EXTREMITIES: Normal range of motion, no pitting or edema.  No clubbing or 

cyanosis.





NEUROLOGICAL: Unresponsive, active seizing, decerebrate rigidity





SKIN: Warm, Dry, normal turgor, no rashes or lesions noted.





Course





- Re-evaluation


Re-evalutation: 





02/18/19 11:47


CT scan shows large bleed on the right (MCA distribution) with 7 mm shift from 

right to left.


Patient intubated with etomidate and succinyl choline


Bilateral breath sounds appreciated.





I counseled patient's mother and boyfriend in the family room.  They are aware 

of the situation.





I discussed case with Dr. Bailon who is recommended TXA, mannitol (1 g/kg), 

1 L NS saline after that.





Patient's vent settings:


Tidal volume 400


PEEP of 5 FiO2 40%


Respiratory 12








Sedation: Propofol





Antiepileptic: 


Patient was given IV Ativan at the time of the seizure.


Patient will be loaded with Keppra


(Patient did not get IV Dilantin).





02/18/19 12:01








- Vital Signs


Vital signs: 


                                        











Temp Pulse Resp BP Pulse Ox


 


 95.8 F L  71   14   104/74   100 


 


 02/18/19 12:31  02/18/19 12:20  02/18/19 12:31  02/18/19 12:31  02/18/19 12:31














- Laboratory


Result Diagrams: 


                                 02/18/19 10:27





                                 02/18/19 10:27


Laboratory results interpreted by me: 


                                        











  02/18/19 02/18/19





  10:27 10:27


 


MCV  98 H 


 


MCH  33.7 H 


 


Potassium   3.3 L


 


Glucose   185 H


 


Creatine Kinase   156 H














- Diagnostic Test


Radiology reviewed: Image reviewed, Reports reviewed - Chest x-ray repeat shows 

tube just below the inferior portion of the clavicle.  No infiltrates.





- EKG Interpretation by Me


Rate: Normal


Rhythm: NSR - KG shows normal sinus rhythm with a ventricular rate of 84, no 

acute ST-T wave changes





Procedures





- Intubation


  ** Orotracheal


Time of Intubation: 11:00


Airway evaluation: Other - Actively seizing


Mallampati Classification: Class 3


Medications: Etomidate, Succinylcholine


Intubation method: Orotracheal


Equipment used: Glidescope


ETT size: 7.5


ETT secured at: Teeth


ETT secured at (cm): 20


Breath Sounds after Intubation: Equal


End tidal CO2 confirmed: Yes


Ventilator settings: SIMV





Critical Care Note





- Critical Care Note


Total time excluding time spent on procedures (mins): 60





Discharge





- Discharge


Clinical Impression: 


 Hemorrhagic CVA, Seizure





Condition: Critical


Disposition: On license of [facility]